# Patient Record
Sex: MALE | Race: WHITE | Employment: FULL TIME | URBAN - METROPOLITAN AREA
[De-identification: names, ages, dates, MRNs, and addresses within clinical notes are randomized per-mention and may not be internally consistent; named-entity substitution may affect disease eponyms.]

---

## 2023-03-20 DIAGNOSIS — E11.8 TYPE 2 DIABETES MELLITUS WITH UNSPECIFIED COMPLICATIONS (H): ICD-10-CM

## 2023-03-20 RX ORDER — BLOOD SUGAR DIAGNOSTIC
STRIP MISCELLANEOUS
Qty: 100 STRIP | OUTPATIENT
Start: 2023-03-20

## 2023-04-24 NOTE — TELEPHONE ENCOUNTER
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

## 2023-06-22 ENCOUNTER — TRANSFERRED RECORDS (OUTPATIENT)
Dept: HEALTH INFORMATION MANAGEMENT | Facility: CLINIC | Age: 53
End: 2023-06-22

## 2023-11-03 ENCOUNTER — TRANSFERRED RECORDS (OUTPATIENT)
Dept: HEALTH INFORMATION MANAGEMENT | Facility: CLINIC | Age: 53
End: 2023-11-03

## 2023-12-19 ENCOUNTER — HOSPITAL ENCOUNTER (OUTPATIENT)
Facility: CLINIC | Age: 53
Discharge: HOME | End: 2023-12-19
Attending: PODIATRIST | Admitting: PODIATRIST
Payer: COMMERCIAL

## 2023-12-19 ENCOUNTER — APPOINTMENT (OUTPATIENT)
Dept: GENERAL RADIOLOGY | Facility: CLINIC | Age: 53
End: 2023-12-19
Attending: PODIATRIST
Payer: COMMERCIAL

## 2023-12-19 VITALS
RESPIRATION RATE: 17 BRPM | TEMPERATURE: 98 F | OXYGEN SATURATION: 100 % | BODY MASS INDEX: 25.2 KG/M2 | HEART RATE: 68 BPM | SYSTOLIC BLOOD PRESSURE: 129 MMHG | DIASTOLIC BLOOD PRESSURE: 85 MMHG | HEIGHT: 71 IN | WEIGHT: 180 LBS

## 2023-12-19 DIAGNOSIS — G89.18 ACUTE POST-OPERATIVE PAIN: ICD-10-CM

## 2023-12-19 DIAGNOSIS — S90.454A: ICD-10-CM

## 2023-12-19 DIAGNOSIS — R26.89 UNABLE TO BEAR WEIGHT ON RIGHT LOWER EXTREMITY: Primary | ICD-10-CM

## 2023-12-19 PROCEDURE — 250N000011 HC RX IP 250 OP 636: Performed by: PODIATRIST

## 2023-12-19 PROCEDURE — 250N000009 HC RX 250: Performed by: PODIATRIST

## 2023-12-19 PROCEDURE — 360N000078 HC SURGERY LEVEL 5, PER MIN: Performed by: PODIATRIST

## 2023-12-19 PROCEDURE — 999N000141 HC STATISTIC PRE-PROCEDURE NURSING ASSESSMENT: Performed by: PODIATRIST

## 2023-12-19 PROCEDURE — 272N000001 HC OR GENERAL SUPPLY STERILE: Performed by: PODIATRIST

## 2023-12-19 PROCEDURE — 710N000012 HC RECOVERY PHASE 2, PER MINUTE: Performed by: PODIATRIST

## 2023-12-19 PROCEDURE — 999N000065 XR FOOT PORT RIGHT 2 VIEWS: Mod: RT

## 2023-12-19 RX ORDER — HYDROCODONE BITARTRATE AND ACETAMINOPHEN 5; 325 MG/1; MG/1
1 TABLET ORAL EVERY 6 HOURS PRN
Qty: 12 TABLET | Refills: 0 | Status: SHIPPED | OUTPATIENT
Start: 2023-12-19 | End: 2023-12-22

## 2023-12-19 RX ORDER — ROSUVASTATIN CALCIUM 20 MG/1
1 TABLET, COATED ORAL AT BEDTIME
COMMUNITY
Start: 2023-03-03

## 2023-12-19 RX ORDER — MAGNESIUM HYDROXIDE 1200 MG/15ML
LIQUID ORAL PRN
Status: DISCONTINUED | OUTPATIENT
Start: 2023-12-19 | End: 2023-12-19 | Stop reason: HOSPADM

## 2023-12-19 RX ORDER — HYDROCODONE BITARTRATE AND ACETAMINOPHEN 5; 325 MG/1; MG/1
1 TABLET ORAL
Status: DISCONTINUED | OUTPATIENT
Start: 2023-12-19 | End: 2023-12-19 | Stop reason: HOSPADM

## 2023-12-19 RX ORDER — INSULIN GLARGINE 100 [IU]/ML
10 INJECTION, SOLUTION SUBCUTANEOUS
Status: ON HOLD | COMMUNITY
Start: 2023-03-11 | End: 2023-12-19

## 2023-12-19 RX ORDER — LOSARTAN POTASSIUM AND HYDROCHLOROTHIAZIDE 12.5; 1 MG/1; MG/1
1 TABLET ORAL DAILY
COMMUNITY
Start: 2023-08-07

## 2023-12-19 RX ORDER — PREDNISOLONE ACETATE 10 MG/ML
SUSPENSION/ DROPS OPHTHALMIC
COMMUNITY
Start: 2023-02-16

## 2023-12-19 RX ORDER — ACETAMINOPHEN 325 MG/1
650 TABLET ORAL
Status: DISCONTINUED | OUTPATIENT
Start: 2023-12-19 | End: 2023-12-19 | Stop reason: HOSPADM

## 2023-12-19 RX ORDER — CEFAZOLIN SODIUM/WATER 2 G/20 ML
2 SYRINGE (ML) INTRAVENOUS
Status: COMPLETED | OUTPATIENT
Start: 2023-12-19 | End: 2023-12-19

## 2023-12-19 RX ORDER — CEFAZOLIN SODIUM/WATER 2 G/20 ML
2 SYRINGE (ML) INTRAVENOUS SEE ADMIN INSTRUCTIONS
Status: DISCONTINUED | OUTPATIENT
Start: 2023-12-19 | End: 2023-12-19 | Stop reason: HOSPADM

## 2023-12-19 RX ADMIN — Medication 2 G: at 10:50

## 2023-12-19 ASSESSMENT — ACTIVITIES OF DAILY LIVING (ADL)
ADLS_ACUITY_SCORE: 35
ADLS_ACUITY_SCORE: 35

## 2023-12-19 NOTE — OR NURSING
VSS. A&O. Denies pain. No n/v, jonelle PO. Dressing CDI. DC instructions, meds and follow up reviewed with patient. Questions answered, able to teachback. DC to home with rubén.

## 2023-12-19 NOTE — DISCHARGE INSTRUCTIONS
Reasons to contact your surgeon:    Signs of possible infection: Check your incision daily for redness, swelling, warmth, red streaks or foul drainage.   Elevated temperature.  Pain not controlled with pain medication and/or rest.   Uncontrolled nausea or vomiting.  Any questions or concerns.       **If you have questions or concerns about your procedure,   call Dr. Shields  [phone]**

## 2023-12-19 NOTE — OP NOTE
SURGEON: Merlene Shielsd DPM    PROCEDURE: Exploration and Inspection/Removal of foreign body, right foot.    PREOPERATIVE DIAGNOSIS: Painful foreign body, right foot.    POSTOPERATIVE DIAGNOSIS: Same.    ANESTHESIA: local.    HEMOSTASIS: none    ESTIMATED BLOOD LOSS: 2 mL.    SPECIMENS: None.    INDICATIONS: This is a 53-year-old male presents complaining of thorns in his right foot at the ball area. He has history of stepping on thorns 1.5 years prior. He had an MRI done (ordered by [facility]) which did show a possible but not any definitive foreign body at the 2nd interspace area. I then recommended an ultrasound for further evaluation. Ultrasound identified a tiny linear structure 5mm long and about 3mm deep from the skin, which could possibly resemble a foreign body but not definitive findings. X rays were negative for any visible foreign body. Patient relates that he could feel that there was something in his foot every time he walks. Clinically, there was no open wound, and there was no visible foreign body noted under the skin, as well as there is no sign of infection. He would like the opportunity to have this checked out surgically. I did discuss the risks of the surgery with him. Due to the fact that the foreign body is not clearly identified on advanced imaging, I may not be able to find any foreign bodies, and surgery does cause post-op pain and the risk of damaging soft tissues in the area/infection, etc. He verbalizes understanding, and would like to proceed with surgery.   Patient was seen preoperatively. Procedure and postop course were discussed as well as alternatives, risks, and complications. No guarantees were given. All questions were answered to patient's satisfaction. The patient agreed to comply and opted to proceed with the operation.    REPORT OF OPERATION: The patient was brought into operating room, properly identified, andplaced on the operating room table in a supine position. 10 mL of  a 1:1  mixture of 1% lidocaine with epinephrine and 0.5% Marcaine plain was injected, performing a modified proximal V block at the plantar aspect of the 2nd, and 3rd metatarsal heads.   Attention was directed to the plantar aspect of the right foot, where at this point a curvilinear incision was made overlying the 2nd interspace. At this point, the incision was deepened in the same plane with care taken to identify and retract all vital neurovascular structures. All superficial bleeders were cauterized as necessary. Next, using blunt dissection as well as finger dissection, I explored through the subcutaneous tissue at the area of suspicious location of foreign body, but did not find anything. Next, I flushed this area with copious amount of saline. Once again, I explored the area and did not find any physical objects. There were no infected materials. I flushed this area with copious amount of sterile saline again, and the  subcutaneous tissues were then closed with 3-0 Vicryl, and then the skin was closed with 3-0 nylon. The incision site was then dressed with Adaptic, 4 x 4, Kerlix, and Ace wrap.     The patient tolerated anesthesia and procedure well without complication and left the  operating room with vital signs stable and vascular status intact to the right foot.  Patient made aware that I could not find any foreign body.     I was present for the entirety of the case and performed all aspects of the procedure.

## 2023-12-27 ENCOUNTER — TRANSFERRED RECORDS (OUTPATIENT)
Dept: HEALTH INFORMATION MANAGEMENT | Facility: CLINIC | Age: 53
End: 2023-12-27
Payer: COMMERCIAL

## (undated) DEVICE — DECANTER BAG 2002S

## (undated) DEVICE — CAST PADDING 6" UNSTERILE 9046

## (undated) DEVICE — DRSG KERLIX 4 1/2"X4YDS ROLL 6715

## (undated) DEVICE — SU VICRYL 3-0 SH 27" J316H

## (undated) DEVICE — PREP CHLORAPREP 26ML TINTED HI-LITE ORANGE 930815

## (undated) DEVICE — PACK EXTREMITY SOP15EXFSD

## (undated) DEVICE — APPLICATOR COTTON TIP 6"X2 STERILE LF 6012

## (undated) DEVICE — LINEN TOWEL PACK X5 5464

## (undated) DEVICE — ESU GROUND PAD UNIVERSAL W/O CORD

## (undated) DEVICE — SOL WATER IRRIG 1000ML BOTTLE 2F7114

## (undated) DEVICE — SOL NACL 0.9% IRRIG 1000ML BOTTLE 2F7124

## (undated) DEVICE — BNDG ELASTIC 6"X5YDS UNSTERILE 6611-60

## (undated) DEVICE — CAST PADDING 4" STERILE 9044S

## (undated) DEVICE — SU ETHILON 3-0 FS-1 18" 669H

## (undated) RX ORDER — CEFAZOLIN SODIUM/WATER 2 G/20 ML
SYRINGE (ML) INTRAVENOUS
Status: DISPENSED
Start: 2023-12-19

## (undated) RX ORDER — BUPIVACAINE HYDROCHLORIDE 5 MG/ML
INJECTION, SOLUTION EPIDURAL; INTRACAUDAL
Status: DISPENSED
Start: 2023-12-19